# Patient Record
Sex: FEMALE | Race: WHITE
[De-identification: names, ages, dates, MRNs, and addresses within clinical notes are randomized per-mention and may not be internally consistent; named-entity substitution may affect disease eponyms.]

---

## 2021-05-13 ENCOUNTER — HOSPITAL ENCOUNTER (EMERGENCY)
Dept: HOSPITAL 63 - ER | Age: 26
Discharge: HOME | End: 2021-05-13
Payer: OTHER GOVERNMENT

## 2021-05-13 VITALS
BODY MASS INDEX: 28.39 KG/M2 | SYSTOLIC BLOOD PRESSURE: 128 MMHG | WEIGHT: 150.36 LBS | HEIGHT: 61 IN | DIASTOLIC BLOOD PRESSURE: 73 MMHG

## 2021-05-13 DIAGNOSIS — Z88.6: ICD-10-CM

## 2021-05-13 DIAGNOSIS — Z88.8: ICD-10-CM

## 2021-05-13 DIAGNOSIS — G43.909: Primary | ICD-10-CM

## 2021-05-13 PROCEDURE — 70450 CT HEAD/BRAIN W/O DYE: CPT

## 2021-05-13 NOTE — PHYS DOC
General Adult


EDM:


Chief Complaint:  OTHER COMPLAINTS





HPI:


HPI:





Patient is a 25-year-old female who presents with light sensitivity and 

migraines.  Patient states that she had been stationed in [place] when she had her

baby in January.  At that time she was told she had a subdural hematoma and 

hypertension.  Patient is concerned and requesting a CT of her head to rule out 

any acute abnormalities.  Also reporting chronic back pain from the epidural.  

Patient currently takes Flexeril without relief.  Patient is unable to get into 

her PCP and was directed to the emergency room to be seen.  Patient denies chest

pain, dizziness, confusion.  Patient has history of GERD, chronic back pain, 

hypertension, PCOS.


 (SINCERE ZULETA)





Review of Systems:


Review of Systems:


Constitutional:  Denies fever or chills 


Eyes:  Denies change in visual acuity 


HENT:  Denies nasal congestion or sore throat 


Respiratory:  Denies cough or shortness of breath 


Cardiovascular:  Denies chest pain or edema 


GI:  Denies abdominal pain, nausea, vomiting, bloody stools or diarrhea 


: Denies dysuria 


Musculoskeletal: Chronic back pain, denies joint pain 


Integument:  Denies rash 


Neurologic: Reports history of migraines with  light sensitivity


Endocrine:  Denies polyuria or polydipsia 


Lymphatic:  Denies swollen glands 


Psychiatric:  Denies depression or anxiety


 (SINCERE ZULETA)





Allergies:


Allergies:





Allergies








Coded Allergies Type Severity Reaction Last Updated Verified


 


  guaifenesin Allergy Unknown  5/13/21 Yes


 


  methocarbamol Allergy Unknown  5/13/21 Yes








 (SINCERE ZULETA)





Physical Exam:


PE:





Constitutional: Well developed, well nourished, no acute distress, non-toxic 

appearance. []


HENT: Normocephalic, atraumatic, bilateral external ears normal, oropharynx mois

t, no oral exudates, nose normal. []


Eyes: PERRLA, EOMI, conjunctiva normal, no discharge. [] 


Neck: Normal range of motion, no tenderness, supple, no stridor. [] 


Cardiovascular:Heart rate regular rhythm, no murmur []


Lungs & Thorax:  Bilateral breath sounds clear to auscultation []


Abdomen: Bowel sounds normal, soft, no tenderness, no masses, no pulsatile 

masses. [] 


Skin: Warm, dry, no erythema, no rash. [] 


Back: Lower back tenderness, no CVA tenderness. [] 


Extremities: No tenderness, no cyanosis, no clubbing, ROM intact, no edema. [] 


Neurologic: Alert and oriented X 3, normal motor function, normal sensory 

function, no focal deficits noted. []


Psychologic: Affect normal, judgement normal, mood normal. []


 (SINCERE ZULETA)





EKG:


EKG:


[]


 (SINCERE ZULETA)





Radiology/Procedures:


Radiology/Procedures:


[]CT HEAD/BRAIN WO





Clinical indications: Reason: migraine  





COMPARISON: None available.





Technique: Noncontrast axial cross sectional scanning of the head was performed.

 





PQRS compliance Statement





One or more of the following individualized dose reduction techniques were 

utilized for this study:


1.  Automated exposure control


2.  Adjustment of the mA and/or kV according to patient size


3.  Use of iterative reconstruction technique








Findings: No acute intraparenchymal hemorrhage or midline shift or mass-effect 

or hydrocephalus or extra-axial fluid collection is seen. However, there is a 

plaque-like extra-axial infiltrative process measuring 0.6 cm in thickness loc

ated in the bifrontal region. It is hyperdense in appearance. Multiple plaque-

like areas of calcification/ossification are seen within it. This is consistent 

with a meningioma. This measures 9.2 cm in transverse dimension. No focal 

hypodense area or sulci effacement is seen to indicate an acute infarct or edema

 radiographically.  No skull fracture or pneumocephalus is seen. No opac

ification of the mastoid sinuses or the middle ear cavities or the paranasal 

sinuses is seen. There is mild mucosal thickening of both ethmoid sinuses 

however. The maxillary sinuses are not completely seen in this study.





IMPRESSION: No acute intracranial abnormality is seen. Plaque-like meningioma of

 the bifrontal region measuring 0.6 cm in thickness and 9.2 cm in transverse 

dimension.





Electronically signed by: Andrea Flaherty MD (5/13/2021 2:09 PM) [id number]


 (SINCERE ZULETA)





Heart Score:


C/O Chest Pain:  No


Risk Factors:


Risk Factors:  DM, Current or recent (<one month) smoker, HTN, HLP, family 

history of CAD, obesity.


Risk Scores:


Score 0 - 3:  2.5% MACE over next 6 weeks - Discharge Home


Score 4 - 6:  20.3% MACE over next 6 weeks - Admit for Clinical Observation


Score 7 - 10:  72.7% MACE over next 6 weeks - Early Invasive Strategies


 (SINCERE ZULETA)





Course & Med Decision Making:


Course & Med Decision Making


Pertinent Labs and Imaging studies reviewed. (See chart for details)





[] Patient is 25-year-old female who presents with migraines and light 

sensitivity.  Patient states that she delivered her baby in January when she was

 in [place].  Patient was told at that time she had a subdural hematoma.  Patient 

states that she has been trying to get into her PCP but unable to obtain an 

appointment and was sent to the emergency room for evaluation.  Patient is 

concerned that she has gotten worse and wanting a CT of her head.  CT of head 

without contrast ordered to rule out any acute abnormalities or changes.  

Patient is also reporting chronic back pain since the epidural.  Patient 

currently takes Flexeril without any relief.  CT of head without contrast 

negative for any acute abnormality.  CT shows plaque-like meningioma of the 

bifrontal region measuring 0.6 cm in thickness and 9.2 cm in transverse 

dimension.  Patient is given hydrocodone for pain relief.  Patient is given a 

copy of imaging to take to her PCP and also a printout of the report.  

Instructed patient to call her PCP and let them know that she was seen in the 

emergency room and needs a follow-up appointment.  Patient needs possible 

referral for neurology and physical therapy for her lower back pain.  Patient is

 hemodynamically stable and able to ambulate on her own out of the emergency 

room.


 (SINCERE ZULETA)





Dragon Disclaimer:


Dragon Disclaimer:


This electronic medical record was generated, in whole or in part, using a voice

 recognition dictation system.


 (SINCERE ZULETA)


Attending Co-Sign


The patient was seen and interviewed as well as examined at the bedside. The 

chart was reviewed. The case was discussed. Agree with the plan of care.


 (MARIANO REYNA DO)





Departure


Departure:


Impression:  


   Primary Impression:  


   Migraine


   Qualified Codes:  G43.009 - Migraine without aura, not intractable, without 

   status migrainosus


Disposition:  01 HOME / SELF CARE / HOMELESS


Referrals:  


YUE BRUSH DO (PCP)


Patient Instructions:  Back Pain, Adult, Migraine Headache, Easy-to-Read





Additional Instructions:  


You were seen in the emergency room today for migraine and light sensitivity, 

chronic back pain.  The CT of your head did not show any acute abnormalities.  

You were given a hydrocodone to help with pain.  You need to follow-up with your

 PCP for further management and possibly physical therapy for your back pain.  

You need to call your PCP today let them know you were seen in the emergency 

room and that you are needing to be seen for a follow-up so that you can get a 

referral.  Please return to the emergency room if you have worsening symptoms or

 concerns.





EMERGENCY DEPARTMENT GENERAL DISCHARGE INSTRUCTIONS





Thank you for coming to [facility] (ED) today and trusting us

 with you 


care.  We trust that you had a positivie experience in our Emergency Department.

  If you 


wish to speak to the department management, you may call the director at 

[phone].





YOUR FOLLOW UP INSTRUCTIONS ARE AS FOLLOWS:





1.  Do you have a private Doctor?  If you do not have a private doctor, please 

ask for a 


resource list of physicians or clinics that may be able to assist you with 

follow up care.





2.  The Emergency Physician has interpreted your x-rays.  The X-Ray specialist 

will also 


review them.  If there is a change in the findings, you will be notified in 48 

hours when at 


all possible.





3.  A lab test or culture has been done, your results will be reviewed and you 

will be 


notified if you need a change in treatment.





ADDITIONAL INSTRUCTIONS AND INFORMATION:





1.  Your care today has been supervised by a physician who is specially trained 

in emergency 


care.  Many problems require more than one evaluation for a complete diagnosis 

and 


treatment.  We recommend that you schedule your follow up appointment as 

recommended to 


ensure complete treatment of you illness or injury.  If you are unable to obtain

 follow up 


care and continue to have a problem, or if your condition worsens, we recommend 

that you 


return to the ED.





2.  We are not able to safely determine your condition over the phone nor are we

 able to 


give sound medical advice over the phone.  For these safety reasons, if you call

 for medical 


advice we will ask you to come to the ED for further evaluation.





3.  If you have any questions regarding these discharge instructions please call

 the ED at 


[phone].





SAFETY INFORMATION:





In the interest of safety, wellness, and injury prevention; we encourage you to 

wear your 


sealbelt, if you smoke; quite smoking, and we encourage family to use a 

protective helmet 


for bicycling and other sporting events that present an increased risk for head 

injury.





IF YOUR SYMPTOMS WORSEN OR NEW SYMPTOMS DEVELOP, OR YOU HAVE CONCERNS ABOUT YOUR

 CONDITION; 


OR IF YOUR CONDITION WORSENS WHILE YOU ARE WAITING FOR YOUR FOLLOW UP 

APPOINTMENT; EITHER 


CONTACT YOUR PRIMARY CARE DOCTOR, THE PHYSICIAN WHOSE NAME AND NUMBER YOU WERE 

GIVEN, OR 


RETURN TO THE ED IMMEDIATELY.











SINCERE ZULETA               May 13, 2021 13:41


MARIANO REYNA DO                 May 14, 2021 06:36

## 2021-05-13 NOTE — RAD
CT HEAD/BRAIN WO



Clinical indications: Reason: migraine  



COMPARISON: None available.



Technique: Noncontrast axial cross sectional scanning of the head was performed. 



PQRS compliance Statement



One or more of the following individualized dose reduction techniques were utilized for this study:

1.  Automated exposure control

2.  Adjustment of the mA and/or kV according to patient size

3.  Use of iterative reconstruction technique





Findings: No acute intraparenchymal hemorrhage or midline shift or mass-effect or hydrocephalus or ex
tra-axial fluid collection is seen. However, there is a plaque-like extra-axial infiltrative process 
measuring 0.6 cm in thickness located in the bifrontal region. It is hyperdense in appearance. Multip
le plaque-like areas of calcification/ossification are seen within it. This is consistent with a meni
ngioma. This measures 9.2 cm in transverse dimension. No focal hypodense area or sulci effacement is 
seen to indicate an acute infarct or edema radiographically.  No skull fracture or pneumocephalus is 
seen. No opacification of the mastoid sinuses or the middle ear cavities or the paranasal sinuses is 
seen. There is mild mucosal thickening of both ethmoid sinuses however. The maxillary sinuses are not
 completely seen in this study.



IMPRESSION: No acute intracranial abnormality is seen. Plaque-like meningioma of the bifrontal region
 measuring 0.6 cm in thickness and 9.2 cm in transverse dimension.



Electronically signed by: Andrea Flaherty MD (5/13/2021 2:09 PM) [id number]

## 2021-06-29 ENCOUNTER — HOSPITAL ENCOUNTER (EMERGENCY)
Dept: HOSPITAL 63 - ER | Age: 26
Discharge: HOME | End: 2021-06-29
Payer: OTHER GOVERNMENT

## 2021-06-29 VITALS — BODY MASS INDEX: 28.39 KG/M2 | WEIGHT: 150.36 LBS | HEIGHT: 61 IN

## 2021-06-29 VITALS
DIASTOLIC BLOOD PRESSURE: 79 MMHG | SYSTOLIC BLOOD PRESSURE: 125 MMHG | SYSTOLIC BLOOD PRESSURE: 125 MMHG | DIASTOLIC BLOOD PRESSURE: 79 MMHG

## 2021-06-29 DIAGNOSIS — R07.81: Primary | ICD-10-CM

## 2021-06-29 DIAGNOSIS — G89.29: ICD-10-CM

## 2021-06-29 DIAGNOSIS — K21.9: ICD-10-CM

## 2021-06-29 DIAGNOSIS — R07.2: ICD-10-CM

## 2021-06-29 DIAGNOSIS — I10: ICD-10-CM

## 2021-06-29 DIAGNOSIS — Z88.8: ICD-10-CM

## 2021-06-29 LAB
ALBUMIN SERPL-MCNC: 3.1 G/DL (ref 3.4–5)
ALBUMIN/GLOB SERPL: 0.8 {RATIO} (ref 1–1.7)
ALP SERPL-CCNC: 72 U/L (ref 46–116)
ALT SERPL-CCNC: 13 U/L (ref 14–59)
ANION GAP SERPL CALC-SCNC: 10 MMOL/L (ref 6–14)
AST SERPL-CCNC: 12 U/L (ref 15–37)
BASOPHILS # BLD AUTO: 0 X10^3/UL (ref 0–0.2)
BASOPHILS NFR BLD: 1 % (ref 0–3)
BILIRUB SERPL-MCNC: 0.1 MG/DL (ref 0.2–1)
BUN/CREAT SERPL: 20 (ref 6–20)
CA-I SERPL ISE-MCNC: 16 MG/DL (ref 7–20)
CALCIUM SERPL-MCNC: 9 MG/DL (ref 8.5–10.1)
CHLORIDE SERPL-SCNC: 105 MMOL/L (ref 98–107)
CO2 SERPL-SCNC: 26 MMOL/L (ref 21–32)
CREAT SERPL-MCNC: 0.8 MG/DL (ref 0.6–1)
EOSINOPHIL NFR BLD: 0.3 X10^3/UL (ref 0–0.7)
EOSINOPHIL NFR BLD: 5 % (ref 0–3)
ERYTHROCYTE [DISTWIDTH] IN BLOOD BY AUTOMATED COUNT: 14.5 % (ref 11.5–14.5)
GFR SERPLBLD BASED ON 1.73 SQ M-ARVRAT: 85.4 ML/MIN
GLOBULIN SER-MCNC: 3.8 G/DL (ref 2.2–3.8)
GLUCOSE SERPL-MCNC: 107 MG/DL (ref 70–99)
HCT VFR BLD CALC: 34.1 % (ref 36–47)
HGB BLD-MCNC: 11.2 G/DL (ref 12–15.5)
LYMPHOCYTES # BLD: 2.5 X10^3/UL (ref 1–4.8)
LYMPHOCYTES NFR BLD AUTO: 42 % (ref 24–48)
MCH RBC QN AUTO: 30 PG (ref 25–35)
MCHC RBC AUTO-ENTMCNC: 33 G/DL (ref 31–37)
MCV RBC AUTO: 90 FL (ref 79–100)
MONO #: 0.6 X10^3/UL (ref 0–1.1)
MONOCYTES NFR BLD: 10 % (ref 0–9)
NEUT #: 2.5 X10^3UL (ref 1.8–7.7)
NEUTROPHILS NFR BLD AUTO: 42 % (ref 31–73)
PLATELET # BLD AUTO: 448 X10^3/UL (ref 140–400)
POTASSIUM SERPL-SCNC: 4.1 MMOL/L (ref 3.5–5.1)
PROT SERPL-MCNC: 6.9 G/DL (ref 6.4–8.2)
RBC # BLD AUTO: 3.79 X10^6/UL (ref 3.5–5.4)
SODIUM SERPL-SCNC: 141 MMOL/L (ref 136–145)
WBC # BLD AUTO: 5.9 X10^3/UL (ref 4–11)

## 2021-06-29 PROCEDURE — 85025 COMPLETE CBC W/AUTO DIFF WBC: CPT

## 2021-06-29 PROCEDURE — 81025 URINE PREGNANCY TEST: CPT

## 2021-06-29 PROCEDURE — 99285 EMERGENCY DEPT VISIT HI MDM: CPT

## 2021-06-29 PROCEDURE — 71045 X-RAY EXAM CHEST 1 VIEW: CPT

## 2021-06-29 PROCEDURE — 80053 COMPREHEN METABOLIC PANEL: CPT

## 2021-06-29 PROCEDURE — 84484 ASSAY OF TROPONIN QUANT: CPT

## 2021-06-29 PROCEDURE — 36415 COLL VENOUS BLD VENIPUNCTURE: CPT

## 2021-06-29 PROCEDURE — 93005 ELECTROCARDIOGRAM TRACING: CPT

## 2021-06-29 NOTE — RAD
Exam: Chest one view



INDICATION: Chest pain



TECHNIQUE: Frontal view of the chest



Comparisons: None



FINDINGS:

The cardiomediastinal silhouette and pulmonary vessels are within normal limits.



The lung and pleural spaces are clear.



IMPRESSION:

No acute cardiopulmonary process.



Electronically signed by: Agustin Grove MD (6/29/2021 8:41 PM) [facility]

## 2021-06-29 NOTE — EKG
[facility] [address], [place] [address]

Test Date:    2021               Test Time:    20:13:18

Pat Name:     ENZO MEJÍA         Department:   

Patient ID:   [id number]           Room:          

Gender:       F                        Technician:   

:          1995               Requested By: SINCERE ZULETA

Order Number: [id number]            Reading MD:     

                                 Measurements

Intervals                              Axis          

Rate:         76                       P:            34

ME:           142                      QRS:          31

QRSD:         76                       T:            38

QT:           372                                    

QTc:          418                                    

                           Interpretive Statements

SINUS RHYTHM

NORMAL ECG

RI6.02

No previous ECG available for comparison

## 2021-06-29 NOTE — PHYS DOC
Past History


Past Medical History:  Hypertension, Ovarian Cyst, Other


Additional Past Medical Histor:  PCOS, SUBDURAL HEMOTOMA, DJD(FACET)


 (SINCERE ZULETA)


Past Surgical History:  


 (SINCERE ZULETA)


Alcohol Use:  None


 (SINCERE ZULETA)





General Adult


EDM:


Chief Complaint:  CHEST PAIN





HPI:


HPI:





Patient is a 26-year-old female who presents with midsternal chest pain that 

started on Wednesday.  Patient states "pain is constant and feels like a 

stabbing pain".  Patient denies any radiation of pain.  Denies shortness of 

breath, nausea/vomiting/diarrhea, dizziness.  Patient states "I also feel like I

am getting more tired than normal when I am trying to go up and down the 

stairs".  "I was recently sick about 3 weeks ago with a cough and upper 

respiratory infection".  Patient has a history of chronic back pain, PCOS, GERD


 (SINCERE ZULETA)





Review of Systems:


Review of Systems:


Constitutional:  Denies fever or chills 


Eyes:  Denies change in visual acuity 


HENT:  Denies nasal congestion or sore throat 


Respiratory:  Denies cough or shortness of breath 


Cardiovascular: Reports midsternal chest pain


GI:  Denies abdominal pain, nausea, vomiting, bloody stools or diarrhea 


: Denies dysuria 


Musculoskeletal:  Denies back pain or joint pain 


Integument:  Denies rash 


Neurologic:  Denies headache, focal weakness or sensory changes 


Endocrine:  Denies polyuria or polydipsia 


Lymphatic:  Denies swollen glands 


Psychiatric:  Denies depression or anxiety


 (SINCERE ZULETA)





Allergies:


Allergies:





Allergies








Coded Allergies Type Severity Reaction Last Updated Verified


 


  guaifenesin Allergy Unknown  21 Yes


 


  methocarbamol Allergy Unknown  21 Yes








 (SINCERE ZULETA)





Physical Exam:


PE:





Constitutional: Well developed, well nourished, no acute distress, non-toxic 

appearance. []


HENT: Normocephalic, atraumatic, bilateral external ears normal, oropharynx mo

ist, no oral exudates, nose normal. []


Eyes: PERRLA, EOMI, conjunctiva normal, no discharge. [] 


Neck: Normal range of motion, no tenderness, supple, no stridor. [] 


Cardiovascular:Heart rate regular rhythm, no murmur []


Lungs & Thorax:  Bilateral breath sounds clear to auscultation []


Abdomen: Bowel sounds normal, soft, no tenderness, no masses, no pulsatile 

masses. [] 


Skin: Warm, dry, no erythema, no rash. [] 


Back: No tenderness, no CVA tenderness. [] 


Extremities: No tenderness, no cyanosis, no clubbing, ROM intact, no edema. [] 


Neurologic: Alert and oriented X 3, normal motor function, normal sensory 

function, no focal deficits noted. []


Psychologic: Affect normal, judgement normal, mood normal. []


 (SINCERE ZULETA)





Current Patient Data:


Labs:





                                Laboratory Tests








Test


 21


20:27


 


POC Urine HCG, Qualitative


 hcg negative


(Negative)








Vital Signs:





                                   Vital Signs








  Date Time  Temp Pulse Resp B/P (MAP) Pulse Ox O2 Delivery O2 Flow Rate FiO2


 


21 19:48 98.2 74 18 117/74 (88) 96 Room Air  








 (SINCERE ZULETA)





EKG:


EKG:


Sinus rhythm.  Heart rate 76 bpm []


 (SINCERE ZULETA)





Radiology/Procedures:


Radiology/Procedures:


[]Exam: Chest one view





INDICATION: Chest pain





TECHNIQUE: Frontal view of the chest





Comparisons: None





FINDINGS:


The cardiomediastinal silhouette and pulmonary vessels are within normal limits.





The lung and pleural spaces are clear.





IMPRESSION:


No acute cardiopulmonary process.





Electronically signed by: Agustin Grove MD (2021 8:41 PM) [facility]


 (SINCERE ZULETA)





Heart Score:


C/O Chest Pain:  Yes


HEART Score for Chest Pain:  








HEART Score for Chest Pain Response (Comments) Value


 


History Slighlty/Non-Suspicious 0


 


ECG Normal 0


 


Age < 45 0


 


Risk Factors No Risk Factors 0


 


Troponin < Normal Limit 0


 


Total  0








Risk Factors:


Risk Factors:  DM, Current or recent (<one month) smoker, HTN, HLP, family 

history of CAD, obesity.


Risk Scores:


Score 0 - 3:  2.5% MACE over next 6 weeks - Discharge Home


Score 4 - 6:  20.3% MACE over next 6 weeks - Admit for Clinical Observation


Score 7 - 10:  72.7% MACE over next 6 weeks - Early Invasive Strategies


 (SINCERE ZULETA)





Course & Med Decision Making:


Course & Med Decision Making


Pertinent Labs and Imaging studies reviewed. (See chart for details)





[] 26-year-old female presents with midsternal chest pain that started on 

Wednesday.  All labs unremarkable.  Chest x-ray is negative.  Troponin is 

negative.  Heart score of 0.  EKG shows sinus rhythm.  Patient was likely has 

pleuritic chest pain.  Patient was recently sick with a cough.  Discussed 

results with patient.  Patient is appreciative and okay with discharge plan.  

Patient is hemodynamically stable able to ambulate on her own in the emergency 

room.


 (SINCERE ZULETA)


Course & Med Decision Making


Did not see or evaluate patient.  Agree with NP's work-up and disposition per 

note.


 (KADY BRAVO MD)


Dragon Disclaimer:


Dragon Disclaimer:


This electronic medical record was generated, in whole or in part, using a voice

 recognition dictation system.


 (SINCERE ZULETA)





Departure


Departure:


Impression:  


   Primary Impression:  


   Chest pain, pleuritic


Disposition:   HOME / SELF CARE / HOMELESS


Condition:  STABLE


Referrals:  


JARED ROSALES DO (PCP)


Patient Instructions:  Pleurisy, Easy-to-Read





Additional Instructions:  


You were seen in the emergency room for pleuritic chest pain.  All of your labs 

were unremarkable.  Chest x-ray was negative.  Please follow-up with your PCP if

 you continue to have discomfort.  Ibuprofen and Tylenol at home for pain.  Turn

 emergency room for worsening symptoms or concerns.





EMERGENCY DEPARTMENT GENERAL DISCHARGE INSTRUCTIONS





Thank you for coming to [facility] (ED) today and trusting us

 with you 


care.  We trust that you had a positivie experience in our Emergency Department.

  If you 


wish to speak to the department management, you may call the director at 

[phone].





YOUR FOLLOW UP INSTRUCTIONS ARE AS FOLLOWS:





1.  Do you have a private Doctor?  If you do not have a private doctor, please 

ask for a 


resource list of physicians or clinics that may be able to assist you with 

follow up care.





2.  The Emergency Physician has interpreted your x-rays.  The X-Ray specialist 

will also 


review them.  If there is a change in the findings, you will be notified in 48 h

ours when at 


all possible.





3.  A lab test or culture has been done, your results will be reviewed and you 

will be 


notified if you need a change in treatment.





ADDITIONAL INSTRUCTIONS AND INFORMATION:





1.  Your care today has been supervised by a physician who is specially trained 

in emergency 


care.  Many problems require more than one evaluation for a complete diagnosis 

and 


treatment.  We recommend that you schedule your follow up appointment as 

recommended to 


ensure complete treatment of you illness or injury.  If you are unable to obtain

 follow up 


care and continue to have a problem, or if your condition worsens, we recommend 

that you 


return to the ED.





2.  We are not able to safely determine your condition over the phone nor are we

 able to 


give sound medical advice over the phone.  For these safety reasons, if you call

 for medical 


advice we will ask you to come to the ED for further evaluation.





3.  If you have any questions regarding these discharge instructions please call

 the ED at 


[phone].





SAFETY INFORMATION:





In the interest of safety, wellness, and injury prevention; we encourage you to 

wear your 


sealbelt, if you smoke; quite smoking, and we encourage family to use a 

protective helmet 


for bicycling and other sporting events that present an increased risk for head 

injury.





IF YOUR SYMPTOMS WORSEN OR NEW SYMPTOMS DEVELOP, OR YOU HAVE CONCERNS ABOUT YOUR

 CONDITION; 


OR IF YOUR CONDITION WORSENS WHILE YOU ARE WAITING FOR YOUR FOLLOW UP 

APPOINTMENT; EITHER 


CONTACT YOUR PRIMARY CARE DOCTOR, THE PHYSICIAN WHOSE NAME AND NUMBER YOU WERE 

GIVEN, OR 


RETURN TO THE ED IMMEDIATELY.











SINCERE ZULETA               2021 20:33


KADY BRAVO MD               2021 23:34

## 2021-09-10 ENCOUNTER — HOSPITAL ENCOUNTER (OUTPATIENT)
Dept: HOSPITAL 63 - RAD | Age: 26
End: 2021-09-10
Attending: NURSE PRACTITIONER
Payer: OTHER GOVERNMENT
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
## 2021-09-10 DIAGNOSIS — S69.92XA: Primary | ICD-10-CM
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
## 2021-09-10 PROCEDURE — 73110 X-RAY EXAM OF WRIST: CPT

## 2021-09-10 NOTE — RAD
Exam: Left wrist 4 views



INDICATION: Left wrist pain



TECHNIQUE: Frontal, lateral and oblique views of the left wrist with scaphoid view.



Comparisons: None



FINDINGS:

Bone mineralization is normal. No acute or healed fractures. Soft tissues are unremarkable. Joint spa
sandra are well-maintained.



IMPRESSION:

No acute osseous abnormality of the left wrist. If the patient is demonstrating snuffbox tenderness r
ecommend splinting with repeat imaging in 5-7 days to rule out an occult scaphoid injury.



Electronically signed by: Agustin Grove MD (9/10/2021 3:02 PM) [facility]

## 2021-09-22 ENCOUNTER — HOSPITAL ENCOUNTER (EMERGENCY)
Dept: HOSPITAL 63 - ER | Age: 26
Discharge: HOME | End: 2021-09-22
Payer: OTHER GOVERNMENT

## 2021-09-22 VITALS — DIASTOLIC BLOOD PRESSURE: 84 MMHG | SYSTOLIC BLOOD PRESSURE: 131 MMHG

## 2021-09-22 VITALS — HEIGHT: 61 IN | BODY MASS INDEX: 28.39 KG/M2 | WEIGHT: 150.36 LBS

## 2021-09-22 DIAGNOSIS — Z88.8: ICD-10-CM

## 2021-09-22 DIAGNOSIS — R00.0: ICD-10-CM

## 2021-09-22 DIAGNOSIS — R00.2: Primary | ICD-10-CM

## 2021-09-22 DIAGNOSIS — I10: ICD-10-CM

## 2021-09-22 DIAGNOSIS — Z98.890: ICD-10-CM

## 2021-09-22 LAB
ANION GAP SERPL CALC-SCNC: 10 MMOL/L (ref 6–14)
BASOPHILS # BLD AUTO: 0 X10^3/UL (ref 0–0.2)
BASOPHILS NFR BLD: 1 % (ref 0–3)
CA-I SERPL ISE-MCNC: 12 MG/DL (ref 7–20)
CALCIUM SERPL-MCNC: 9.3 MG/DL (ref 8.5–10.1)
CHLORIDE SERPL-SCNC: 105 MMOL/L (ref 98–107)
CO2 SERPL-SCNC: 25 MMOL/L (ref 21–32)
CREAT SERPL-MCNC: 0.7 MG/DL (ref 0.6–1)
EOSINOPHIL NFR BLD: 0.1 X10^3/UL (ref 0–0.7)
EOSINOPHIL NFR BLD: 2 % (ref 0–3)
ERYTHROCYTE [DISTWIDTH] IN BLOOD BY AUTOMATED COUNT: 14.8 % (ref 11.5–14.5)
GFR SERPLBLD BASED ON 1.73 SQ M-ARVRAT: 99.6 ML/MIN
GLUCOSE SERPL-MCNC: 108 MG/DL (ref 70–99)
HCT VFR BLD CALC: 38.8 % (ref 36–47)
HGB BLD-MCNC: 12.7 G/DL (ref 12–15.5)
LYMPHOCYTES # BLD: 2.2 X10^3/UL (ref 1–4.8)
LYMPHOCYTES NFR BLD AUTO: 39 % (ref 24–48)
MCH RBC QN AUTO: 29 PG (ref 25–35)
MCHC RBC AUTO-ENTMCNC: 33 G/DL (ref 31–37)
MCV RBC AUTO: 88 FL (ref 79–100)
MONO #: 0.4 X10^3/UL (ref 0–1.1)
MONOCYTES NFR BLD: 8 % (ref 0–9)
NEUT #: 2.8 X10^3UL (ref 1.8–7.7)
NEUTROPHILS NFR BLD AUTO: 51 % (ref 31–73)
PLATELET # BLD AUTO: 432 X10^3/UL (ref 140–400)
POTASSIUM SERPL-SCNC: 3.9 MMOL/L (ref 3.5–5.1)
RBC # BLD AUTO: 4.4 X10^6/UL (ref 3.5–5.4)
SODIUM SERPL-SCNC: 140 MMOL/L (ref 136–145)
WBC # BLD AUTO: 5.6 X10^3/UL (ref 4–11)

## 2021-09-22 PROCEDURE — 36415 COLL VENOUS BLD VENIPUNCTURE: CPT

## 2021-09-22 PROCEDURE — 96360 HYDRATION IV INFUSION INIT: CPT

## 2021-09-22 PROCEDURE — 80048 BASIC METABOLIC PNL TOTAL CA: CPT

## 2021-09-22 PROCEDURE — 93005 ELECTROCARDIOGRAM TRACING: CPT

## 2021-09-22 PROCEDURE — 85025 COMPLETE CBC W/AUTO DIFF WBC: CPT

## 2021-09-22 PROCEDURE — 99285 EMERGENCY DEPT VISIT HI MDM: CPT

## 2021-09-22 PROCEDURE — 71045 X-RAY EXAM CHEST 1 VIEW: CPT

## 2021-09-22 PROCEDURE — 84443 ASSAY THYROID STIM HORMONE: CPT

## 2021-09-22 RX ADMIN — METOCLOPRAMIDE ONE MG: 5 INJECTION, SOLUTION INTRAMUSCULAR; INTRAVENOUS at 13:00

## 2021-09-22 RX ADMIN — SODIUM CHLORIDE ONE MLS/HR: 0.9 INJECTION, SOLUTION INTRAVENOUS at 13:16

## 2021-09-22 NOTE — EKG
[facility] [address], [place] [address]

Test Date:    2021               Test Time:    13:16:25

Pat Name:     ENZO MEJÍA         Department:   

Patient ID:   [id number]           Room:          

Gender:       F                        Technician:   NEFTALI

:          1995               Requested By: FABRICIO SCOTT

Order Number: [id number]            Reading MD:     

                                 Measurements

Intervals                              Axis          

Rate:         79                       P:            30

ID:           [id number]                      QRS:          35

QRSD:         76                       T:            29

QT:           340                                    

QTc:          391                                    

                           Interpretive Statements

SINUS RHYTHM

NORMAL ECG

RI6.02

No previous ECG available for comparison

## 2021-09-22 NOTE — PHYS DOC
Past History


Past Medical History:  Hypertension, Ovarian Cyst, Other


Additional Past Medical Histor:  PCOS, SUBDURAL HEMOTOMA, DJD(FACET)


Past Surgical History:  , Other


Additional Past Surgical Histo:  cyst removal


Alcohol Use:  None





General Adult


EDM:


Chief Complaint:  RAPID HEART RATE





HPI:


HPI:


26-year-old female with a history of subdural hematoma, recurrent regular 

migraines presents to the emergency department complaining of tachycardia and 

palpitations that started a few hours ago.  She reports that she has never had 

this before, she reports that when she got up in the bathroom and went to the 

hallway, her heart rate went from .  She notes that her heart rate has 

been erratic all day.  She denies any new headache, stating that her current 

migraine is regular for her.  The patient denies nausea, vomiting, fever, 

chills, chest pain, shortness of breath, abdominal pain, urinary symptoms, 

cough, recent trauma, or any other complaints.





Review of Systems:


Review of Systems:


Constitutional: Denies fever or chills. 


Eyes: Denies change in vision, pain.


HENT: Denies congestion or sore throat.


Respiratory: Denies cough or shortness of breath. 


Cardiovascular: Denies palpitations, denies chest pain, syncope. 


GI: Denies abdominal pain, nausea. 


: Denies change in urination, dysuria. 


Musculoskeletal: Denies extremity pain, or trauma. 


Skin: Denies rash, skin change. 


Neurologic: Denies headache, focal weakness. 


Psychiatric: Denies depression or anxiety.





All other systems reviewed as negative except for what was mentioned in the HPI.





Allergies:


Allergies:





Allergies








Coded Allergies Type Severity Reaction Last Updated Verified


 


  guaifenesin Allergy Unknown  21 Yes


 


  methocarbamol Allergy Unknown  21 Yes











Physical Exam:


PE:





Constitutional: Well developed, well nourished, no acute distress, non-toxic 

appearance. []


HENT: Normocephalic, atraumatic, bilateral external ears normal, oropharynx 

moist, no oral exudates, nose normal. []


Eyes: PERRLA, EOMI, conjunctiva normal, no discharge. [] 


Neck: Normal range of motion, no tenderness, supple, no stridor. [] 


Cardiovascular:Heart rate regular rhythm, no murmur []


Lungs & Thorax:  Bilateral breath sounds clear to auscultation []


Abdomen: Bowel sounds normal, soft, no tenderness, no masses, no pulsatile 

masses. [] 


Skin: Warm, dry, no erythema, no rash. [] 


Back: No tenderness, no CVA tenderness. [] 


Extremities: No tenderness, no cyanosis, no clubbing, ROM intact, no edema. [] 


Neurologic: Alert and oriented X 3, normal motor function, normal sensory 

function, no focal deficits noted. []


Psychologic: Affect normal, judgement normal, mood normal. []





Current Patient Data:


Labs:





                                Laboratory Tests








Test


 21


13:11


 


White Blood Count


 5.6 x10^3/uL


(4.0-11.0)


 


Red Blood Count


 4.40 x10^6/uL


(3.50-5.40)


 


Hemoglobin


 12.7 g/dL


(12.0-15.5)


 


Hematocrit


 38.8 %


(36.0-47.0)


 


Mean Corpuscular Volume


 88 fL ()





 


Mean Corpuscular Hemoglobin 29 pg (25-35)  


 


Mean Corpuscular Hemoglobin


Concent 33 g/dL


(31-37)


 


Red Cell Distribution Width


 14.8 %


(11.5-14.5)  H


 


Platelet Count


 432 x10^3/uL


(140-400)  H


 


Neutrophils (%) (Auto) 51 % (31-73)  


 


Lymphocytes (%) (Auto) 39 % (24-48)  


 


Monocytes (%) (Auto) 8 % (0-9)  


 


Eosinophils (%) (Auto) 2 % (0-3)  


 


Basophils (%) (Auto) 1 % (0-3)  


 


Neutrophils # (Auto)


 2.8 x10^3uL


(1.8-7.7)


 


Lymphocytes # (Auto)


 2.2 x10^3/uL


(1.0-4.8)


 


Monocytes # (Auto)


 0.4 x10^3/uL


(0.0-1.1)


 


Eosinophils # (Auto)


 0.1 x10^3/uL


(0.0-0.7)


 


Basophils # (Auto)


 0.0 x10^3/uL


(0.0-0.2)


 


Sodium Level


 140 mmol/L


(136-145)


 


Potassium Level


 3.9 mmol/L


(3.5-5.1)


 


Chloride Level


 105 mmol/L


()


 


Carbon Dioxide Level


 25 mmol/L


(21-32)


 


Anion Gap 10 (6-14)  


 


Blood Urea Nitrogen


 12 mg/dL


(7-20)


 


Creatinine


 0.7 mg/dL


(0.6-1.0)


 


Estimated GFR


(Cockcroft-Gault) 99.6  





 


Glucose Level


 108 mg/dL


(70-99)  H


 


Calcium Level


 9.3 mg/dL


(8.5-10.1)








Vital Signs:





Vital Signs








  Date Time  Temp Pulse Resp B/P (MAP) Pulse Ox O2 Delivery O2 Flow Rate FiO2


 


21 14:30  98 16 131/84 (100) 98 Room Air  


 


21 13:30  100 16 147/87 (107) 97 Room Air  


 


21 12:45 98.3 104 16 140/94 (109) 98   








1509: Heart rate 76, blood pressure 132/84, oxygen saturation 90% on room air





EKG:


EKG:





Normal sinus rhythm rate of 79, no ST-T wave changes, no ectopic beats, normal 

axis, normal IN, QRS, and QTc intervals. Impression: Normal EKG. interpreted by 

Fabricio aldridge D.O.





Radiology/Procedures:


Radiology/Procedures:


PROCEDURE: CHEST AP ONLY





XR CHEST 1V





History: Reason: tachycardia / Spl. Instructions:  / History: 





Comparison: 2021





Findings:


No consolidation or pleural effusion. Normal heart size. No pneumothorax.





Impression: 


1.  No acute cardiopulmonary process.





Electronically signed by: Marlo Sadler DO (2021 1:32 PM)





Heart Score:


C/O Chest Pain:  No





Course & Med Decision Making:


Course & Med Decision Making


Labs EKG and cardiac monitoring are within normal limits.  Patient was initially

 tachycardic but upon her observation in the emergency department she was 

consistently in the 70s.  Plan to discharge the patient home with return 

precautions.  Advised follow-up with cardiology for further monitoring.  She 

looks well the time of discharge with improved vital signs.





Departure


Departure:


Impression:  


   Primary Impression:  


   Heart palpitations


Disposition:   HOME / SELF CARE / HOMELESS


Condition:  GOOD


Referrals:  


JARED ROSALES DO (PCP)








CATHIE JULIEN MD


Patient Instructions:  Nonspecific Tachycardia





Additional Instructions:  


You were seen in the emergency department and your health condition was deemed 

not to require admission to the hospital.  It is important to realize that we 

can only evaluate you during the time that you are in her department.  

Occasionally health conditions can worsen upon leaving the emergency department.

  If this were to happen, please return to and allow us the opportunity to 

reevaluate you.  It is a pleasure to take care of your health needs.  Return to 

the ER if your symptoms worsen, do not improve, or if you develop additional 

symptoms that are concerning to you


Your TSH level is pending, you will receive a call  if this level is abnormal.











FABRICIO SCOTT DO             Sep 22, 2021 13:02

## 2021-09-22 NOTE — RAD
XR CHEST 1V



History: Reason: tachycardia / Spl. Instructions:  / History: 



Comparison: June 29, 2021



Findings:

No consolidation or pleural effusion. Normal heart size. No pneumothorax.



Impression: 

1.  No acute cardiopulmonary process.



Electronically signed by: Marlo Sadler DO (9/22/2021 1:32 PM) [id number]

## 2021-09-26 ENCOUNTER — HOSPITAL ENCOUNTER (EMERGENCY)
Dept: HOSPITAL 63 - ER | Age: 26
Discharge: HOME | End: 2021-09-26
Payer: OTHER GOVERNMENT

## 2021-09-26 VITALS
DIASTOLIC BLOOD PRESSURE: 85 MMHG | SYSTOLIC BLOOD PRESSURE: 129 MMHG | SYSTOLIC BLOOD PRESSURE: 129 MMHG | DIASTOLIC BLOOD PRESSURE: 85 MMHG

## 2021-09-26 VITALS — HEIGHT: 61 IN | WEIGHT: 150.36 LBS | BODY MASS INDEX: 28.39 KG/M2

## 2021-09-26 DIAGNOSIS — I10: ICD-10-CM

## 2021-09-26 DIAGNOSIS — R07.81: Primary | ICD-10-CM

## 2021-09-26 DIAGNOSIS — Z88.8: ICD-10-CM

## 2021-09-26 PROCEDURE — 99285 EMERGENCY DEPT VISIT HI MDM: CPT

## 2021-09-26 PROCEDURE — 71045 X-RAY EXAM CHEST 1 VIEW: CPT

## 2021-09-26 PROCEDURE — 84484 ASSAY OF TROPONIN QUANT: CPT

## 2021-09-26 PROCEDURE — 36415 COLL VENOUS BLD VENIPUNCTURE: CPT

## 2021-09-26 PROCEDURE — 93005 ELECTROCARDIOGRAM TRACING: CPT

## 2021-09-26 NOTE — PHYS DOC
Past History


Past Medical History:  Hypertension, Ovarian Cyst, Other


Additional Past Medical Histor:  PCOS, SUBDURAL HEMOTOMA, DJD(FACET)


 (SINCERE ZULETA)


Past Surgical History:  , Other


Additional Past Surgical Histo:  cyst removal


 (SINCERE ZULETA)


Alcohol Use:  None


 (SINCERE ZULETA)





General Adult


EDM:


Chief Complaint:  CHEST PAIN





HPI:


HPI:





Patient is a 26-year-old female who presents with chest pain that is worse with 

deep breathing and coughing.  Patient was recently diagnosed with Covid.  

Patient denies shortness of breath, fever.  Pain is worse with movement and 

pain.  Patient is able to reproduce pain.  Reports that Tylenol does help with 

discomfort.  But denies taking anything today.  Denies nausea/vomiting/diarrhea


 (SINCERE ZULETA)





Review of Systems:


Review of Systems:


Constitutional:  Denies fever or chills 


Eyes:  Denies change in visual acuity 


HENT:  Denies nasal congestion or sore throat 


Respiratory:  Denies cough or shortness of breath 


Cardiovascular:  Denies chest pain or edema 


GI:  Denies abdominal pain, nausea, vomiting, bloody stools or diarrhea 


: Denies dysuria 


Musculoskeletal:  Denies back pain or joint pain 


Integument:  Denies rash 


Neurologic:  Denies headache, focal weakness or sensory changes 


Endocrine:  Denies polyuria or polydipsia 


Lymphatic:  Denies swollen glands 


Psychiatric:  Denies depression or anxiety


 (SINCERE ZULETA)





Allergies:


Allergies:





Allergies








Coded Allergies Type Severity Reaction Last Updated Verified


 


  guaifenesin Allergy Unknown  21 Yes


 


  methocarbamol Allergy Unknown  21 Yes








 (SINCERE ZULETA)





Physical Exam:


PE:





Constitutional: Well developed, well nourished, no acute distress, non-toxic 

appearance. []


HENT: Normocephalic, atraumatic, bilateral external ears normal, oropharynx 

moist, no oral exudates, nose normal. []


Eyes: PERRLA, EOMI, conjunctiva normal, no discharge. [] 


Neck: Normal range of motion, no tenderness, supple, no stridor. [] 


Cardiovascular:Heart rate regular rhythm, no murmur []


Lungs & Thorax:  Bilateral breath sounds clear to auscultation []


Abdomen: Bowel sounds normal, soft, no tenderness, no masses, no pulsatile 

masses. [] 


Skin: Warm, dry, no erythema, no rash. [] 


Back: No tenderness, no CVA tenderness. [] 


Extremities: No tenderness, no cyanosis, no clubbing, ROM intact, no edema. [] 


Neurologic: Alert and oriented X 3, normal motor function, normal sensory 

function, no focal deficits noted. []


Psychologic: Affect normal, judgement normal, mood normal. []


 (SINCERE ZULETA)





Current Patient Data:


Labs:





                                Laboratory Tests








Test


 21


15:32


 


Troponin I Quantitative


 < 0.017 ng/mL


(0-0.055)








Vital Signs:





                                   Vital Signs








  Date Time  Temp Pulse Resp B/P (MAP) Pulse Ox O2 Delivery O2 Flow Rate FiO2


 


21 15:17 98.5 87 16 134/89 (104) 98 Room Air  








 (SINCERE ZULETA)





EKG:


EKG:


[]


 (SINCERE ZULETA)





Radiology/Procedures:


Radiology/Procedures:


[]


XR CHEST 1V 2021 3:40 PM





INDICATION: Chest pain





COMPARISON: 2021





TECHNIQUE: Portable frontal view of the chest is provided.





FINDINGS:





The cardiomediastinal silhouette is within normal limits. Lungs are clear.





There are no significant pleural effusions. There is no pulmonary vascular 

congestion. No pneumothorax.





No suspicious osseous abnormality.





IMPRESSION:





There is no acute cardiopulmonary process.





Electronically signed by: Fabiola Montes MD (2021 4:13 PM) [facility]


 (SINCERE ZULETA)





Heart Score:


C/O Chest Pain:  Yes


HEART Score for Chest Pain:  








HEART Score for Chest Pain Response (Comments) Value


 


History Slighlty/Non-Suspicious 0


 


ECG Normal 0


 


Age < 45 0


 


Risk Factors No Risk Factors 0


 


Troponin < Normal Limit 0


 


Total  0








Risk Factors:


Risk Factors:  DM, Current or recent (<one month) smoker, HTN, HLP, family 

history of CAD, obesity.


Risk Scores:


Score 0 - 3:  2.5% MACE over next 6 weeks - Discharge Home


Score 4 - 6:  20.3% MACE over next 6 weeks - Admit for Clinical Observation


Score 7 - 10:  72.7% MACE over next 6 weeks - Early Invasive Strategies


 (SINCERE ZULETA)





Course & Med Decision Making:


Course & Med Decision Making


Pertinent Labs and Imaging studies reviewed. (See chart for details)





[] 26-year-old female presents with reproducible, chest pain.  No radiation.  

Denies shortness of breath, fever.  Patient recently was diagnosed with Covid.  

Patient states that she took Tylenol for the discomfort which did help.  Denies 

taking anything today.





Chest x-ray is unremarkable.  Troponin is negative.  Heart score of 0.  

Discussed results with patient.  Explained to patient she is having pleuritic 

chest pain.  Taking ibuprofen and Tylenol for pain.  Gave strict return 

precautions.  Follow-up with PCP.





 (SINCERE ZULETA)





Dragon Disclaimer:


Dragon Disclaimer:


This electronic medical record was generated, in whole or in part, using a voice

 recognition dictation system.


 (SINCERE ZULETA)


Attending Co-Sign


The patient was seen and interviewed as well as examined at the bedside. The 

chart was reviewed. The case was discussed. Agree with the plan of care.


 (MARIANO REYNA DO)





Departure


Departure:


Impression:  


   Primary Impression:  


   Pleuritic chest pain


Disposition:   HOME / SELF CARE / HOMELESS


Condition:  STABLE


Referrals:  


JARED ROSALES DO (PCP)


Patient Instructions:  Pleurisy, Easy-to-Read





Additional Instructions:  


EMERGENCY DEPARTMENT GENERAL DISCHARGE INSTRUCTIONS





Thank you for coming to [facility] (ED) today and trusting us

 with you 


care.  We trust that you had a positivie experience in our Emergency Department.

  If you 


wish to speak to the department management, you may call the director at 

[phone].





YOUR FOLLOW UP INSTRUCTIONS ARE AS FOLLOWS:





1.  Do you have a private Doctor?  If you do not have a private doctor, please 

ask for a 


resource list of physicians or clinics that may be able to assist you with 

follow up care.





2.  The Emergency Physician has interpreted your x-rays.  The X-Ray specialist 

will also 


review them.  If there is a change in the findings, you will be notified in 48 

hours when at 


all possible.





3.  A lab test or culture has been done, your results will be reviewed and you 

will be 


notified if you need a change in treatment.





ADDITIONAL INSTRUCTIONS AND INFORMATION:





1.  Your care today has been supervised by a physician who is specially trained 

in emergency 


care.  Many problems require more than one evaluation for a complete diagnosis 

and 


treatment.  We recommend that you schedule your follow up appointment as 

recommended to 


ensure complete treatment of you illness or injury.  If you are unable to obtain

 follow up 


care and continue to have a problem, or if your condition worsens, we recommend 

that you 


return to the ED.





2.  We are not able to safely determine your condition over the phone nor are we

 able to 


give sound medical advice over the phone.  For these safety reasons, if you call

 for medical 


advice we will ask you to come to the ED for further evaluation.





3.  If you have any questions regarding these discharge instructions please call

 the ED at 


[phone].





SAFETY INFORMATION:





In the interest of safety, wellness, and injury prevention; we encourage you to 

wear your 


sealbelt, if you smoke; quite smoking, and we encourage family to use a 

protective helmet 


for bicycling and other sporting events that present an increased risk for head 

injury.





IF YOUR SYMPTOMS WORSEN OR NEW SYMPTOMS DEVELOP, OR YOU HAVE CONCERNS ABOUT YOUR

 CONDITION; 


OR IF YOUR CONDITION WORSENS WHILE YOU ARE WAITING FOR YOUR FOLLOW UP 

APPOINTMENT; EITHER 


CONTACT YOUR PRIMARY CARE DOCTOR, THE PHYSICIAN WHOSE NAME AND NUMBER YOU WERE 

GIVEN, OR 


RETURN TO THE ED IMMEDIATELY.











SINCERE ZULETA               Sep 26, 2021 17:05


MARIANO REYNA DO                 Sep 27, 2021 15:48

## 2021-09-26 NOTE — RAD
XR CHEST 1V 9/26/2021 3:40 PM



INDICATION: Chest pain



COMPARISON: 9/22/2021



TECHNIQUE: Portable frontal view of the chest is provided.



FINDINGS:



The cardiomediastinal silhouette is within normal limits. Lungs are clear.



There are no significant pleural effusions. There is no pulmonary vascular congestion. No pneumothora
x.



No suspicious osseous abnormality.



IMPRESSION:



There is no acute cardiopulmonary process.



Electronically signed by: Fabiola Montes MD (9/26/2021 4:13 PM) [facility]

## 2021-10-01 ENCOUNTER — HOSPITAL ENCOUNTER (OUTPATIENT)
Dept: HOSPITAL 63 - RAD | Age: 26
End: 2021-10-01
Attending: FAMILY MEDICINE
Payer: OTHER GOVERNMENT

## 2021-10-01 DIAGNOSIS — M79.671: Primary | ICD-10-CM

## 2021-10-01 PROCEDURE — 73630 X-RAY EXAM OF FOOT: CPT

## 2021-10-01 NOTE — RAD
Study: XR FOOT_RIGHT 3 VIEWS



Indication: Injury. Pain.



Comparison: None.



Findings:



No displaced fracture. Alignment is within normal limits. Maintained joint spaces. Radiographically u
nremarkable soft tissues.



Impression:



No displaced fracture or traumatic malalignment.



Electronically signed by: BENITO VILLASENOR MD (10/1/2021 6:41 PM) [facility]

## 2022-03-28 ENCOUNTER — HOSPITAL ENCOUNTER (EMERGENCY)
Dept: HOSPITAL 63 - ER | Age: 27
Discharge: HOME | End: 2022-03-28
Payer: OTHER GOVERNMENT

## 2022-03-28 VITALS — HEIGHT: 61 IN | BODY MASS INDEX: 29.14 KG/M2 | WEIGHT: 154.32 LBS

## 2022-03-28 VITALS — DIASTOLIC BLOOD PRESSURE: 78 MMHG | SYSTOLIC BLOOD PRESSURE: 131 MMHG

## 2022-03-28 DIAGNOSIS — Z88.8: ICD-10-CM

## 2022-03-28 DIAGNOSIS — M25.562: ICD-10-CM

## 2022-03-28 DIAGNOSIS — M25.561: ICD-10-CM

## 2022-03-28 DIAGNOSIS — O16.2: ICD-10-CM

## 2022-03-28 DIAGNOSIS — Z3A.16: ICD-10-CM

## 2022-03-28 DIAGNOSIS — O12.02: Primary | ICD-10-CM

## 2022-03-28 DIAGNOSIS — M54.59: ICD-10-CM

## 2022-03-28 LAB
APTT PPP: YELLOW S
BACTERIA #/AREA URNS HPF: (no result) /HPF
FIBRINOGEN PPP-MCNC: CLEAR MG/DL
GLUCOSE UR STRIP-MCNC: (no result) MG/DL
NITRITE UR QL STRIP: (no result)
RBC #/AREA URNS HPF: 0 /HPF (ref 0–2)
SP GR UR STRIP: 1.02
SQUAMOUS #/AREA URNS LPF: (no result) /LPF
UROBILINOGEN UR-MCNC: 0.2 MG/DL
WBC #/AREA URNS HPF: (no result) /HPF (ref 0–4)

## 2022-03-28 PROCEDURE — 81001 URINALYSIS AUTO W/SCOPE: CPT

## 2022-03-28 PROCEDURE — 99283 EMERGENCY DEPT VISIT LOW MDM: CPT

## 2022-03-28 NOTE — PHYS DOC
Past History


Past Medical History:  Hypertension, Ovarian Cyst, Other


Additional Past Medical Histor:  PCOS, SUBDURAL HEMOTOMA, DJD(FACET)


 (MIHIR RODRIGUEZ)


Past Surgical History:  , Other


Additional Past Surgical Histo:  cyst removal


 (MIHIR RODRIGUEZ)


Smoking:  Non-smoker


Alcohol Use:  None


 (MIHIR RODRIGUEZ)





General Adult


EDM:


Chief Complaint:  LOWER BACK PAIN OR INJURY





HPI:


HPI:





Patient is a 26 year old A1 female who presents at approximately 16 weeks 

gestation with low back pain, bilateral knee pain, and intermittent lower 

extremity swelling.  Patient notices that her feet are swollen in the shower at 

the end of the day, where she notices some pinkish discoloration and darker 

colored spots across the top of her feet and onto her ankles.  Patient has been 

treating her back pain with Tylenol, icy hot and heating pad.  She denies any 

traumatic injury to her back or knees.  Patient denies unilateral leg swelling, 

calf discoloration or tenderness, shortness of breath or cough.





Patient's last pregnancy, she was diagnosed with preeclampsia and had a subdural

hematoma as a result.  She is currently on antihypertensives per her OB/GYN.


 (MIHIR RODRIGUEZ)





Review of Systems:


Review of Systems:


ROS negative or noncontributory except as mentioned in HPI.


 (MIHIR RODRIGUEZ)





Allergies:


Allergies:





Allergies








Coded Allergies Type Severity Reaction Last Updated Verified


 


  guaifenesin Allergy Unknown  21 Yes


 


  methocarbamol Allergy Unknown  21 Yes








 (MIHIR RODRIGUEZ)





Physical Exam:


PE:





Constitutional: Well developed, well nourished, no acute distress, non-toxic ramón

earance. 


HENT: Normocephalic, atraumatic, bilateral external ears normal, nose normal. 


Eyes: EOMI, conjunctiva normal, no discharge.  


Neck: Normal range of motion, no tenderness, no stridor.  


Skin: Warm, dry, no erythema, no rash.  


Back: No step-off, no bony tenderness, no significant muscle spasm or paraspinal

tenderness.


Extremities: No tenderness, no cyanosis, no clubbing, ROM intact, no edema, no 

ballottement noted on knees bilaterally, no deformities, PT pulses 2+ and 

symmetrical, no calf tenderness, negative Homans' sign bilaterally.


Neurologic: Alert and oriented x4, steady and symmetrical upright gait, no focal

deficits noted. 


 (MIHIR RODRIGUEZ)





Current Patient Data:


Labs:





Laboratory Tests








Test


 3/28/22


19:55


 


Urine Collection Type Unknown 


 


Urine Color Yellow 


 


Urine Clarity Clear 


 


Urine pH 6.0 


 


Urine Specific Gravity 1.020 


 


Urine Protein


 Neg


(NEG-TRACE)


 


Urine Glucose (UA)


 Neg mg/dL


(NEG)


 


Urine Ketones (Stick)


 Neg mg/dL


(NEG)


 


Urine Blood Neg (NEG) 


 


Urine Nitrite Neg (NEG) 


 


Urine Bilirubin Neg (NEG) 


 


Urine Urobilinogen Dipstick


 0.2 mg/dL (0.2


mg/dL)


 


Urine Leukocyte Esterase Neg (NEG) 


 


Urine RBC 0 /HPF (0-2) 


 


Urine WBC 1-4 /HPF (0-4) 


 


Urine Squamous Epithelial


Cells Mod /LPF 





 


Urine Bacteria


 Few /HPF


(0-FEW)








Vital Signs:





                          VS - Last 72 Hours, by Label








  Date Time  Temp Pulse Resp B/P (MAP) Pulse Ox O2 Delivery O2 Flow Rate FiO2


 


3/28/22 20:04 97.9 80 16 131/78 (95) 100 Room Air  


 


3/28/22 20:04 97.9 80 16 131/78 (95) 100   








 (MIHIR RODRIGUEZ)





Heart Score:


C/O Chest Pain:  No


 (MIHIR RODRIGUEZ)





Course & Med Decision Making:


Course & Med Decision Making


Pertinent Labs and Imaging studies reviewed. (See chart for details)





Patient is a 26-year-old female in her fourth pregnancy who presents with low 

back pain, bilateral knee pain and intermittent swelling to her bilateral feet. 

In discussing history and presentation and after completing physical exam, 

patient's complaints seem to be related to her pregnancy.  Discussed at length 

nonpharmacological measures to control the swelling in her feet as well as low 

back and knee pain.  Patient states she has an appointment with her OB/GYN on 

2022, which is less than 2 weeks away.





Patient's urine does not reveal proteinuria nor is her blood pressure 

significantly elevated.





Return precautions were provided to the patient.  She understands and is 

agreeable to discharge plan, though she is upset that she was not given an MRI 

of her knees.


 (MIHIR RODRIGUEZ)


Course & Med Decision Making


Did not see or evaluate patient.  Did not discuss patient with NP.  Generally 

agree with NP's work-up and disposition per note


 (KADY BRAVO MD)


Terence Disclaimer:


Dragon Disclaimer:


This electronic medical record was generated, in whole or in part, using a voice

recognition dictation system.


 (MIHIR RODRIGUEZ)





Departure


Departure:


Impression:  


   Primary Impression:  


   Back pain during pregnancy


   Additional Impressions:  


   Knee pain, bilateral


   Qualified Codes:  M25.561 - Pain in right knee; M25.562 - Pain in left knee


   Swelling of lower extremity during pregnancy


   Qualified Codes:  O12.02 - Gestational edema, second trimester


Disposition:   HOME / SELF CARE / HOMELESS


Condition:  IMPROVED


Referrals:  


PCP,NO (PCP)


Patient Instructions:  Back Pain in Pregnancy, Exercise During Pregnancy-Sport

sMed, Knee Pain, Easy-to-Read, Pregnancy and Medications, Easy-to-Read





Additional Instructions:  


EMERGENCY DEPARTMENT GENERAL DISCHARGE INSTRUCTIONS





Thank you for coming to [facility] (ED) today and trusting us

with you care.  We trust that you had a positive experience in our Emergency 

Department.  If you wish to speak to the department management, you may call the

director at [phone].





YOUR FOLLOW UP INSTRUCTIONS ARE AS FOLLOWS:


1.  Follow up with your primary care doctor. If you do not have a primary 

doctor, please ask for a resource list of physicians or clinics that may be able

to assist you with follow up care.


2.  The emergency provider has interpreted your imaging studies, if any were 

ordered.  The radiology imaging specialist also reviewed them.  If there is a 

change in the findings, you will be notified in 48 hours when at all possible.


3.  If a lab test or culture has been done, your results will be reviewed and 

you will be notified if you need a change in treatment.


4.  Follow instructions verbalized to you and refer to the printouts if needed.





ADDITIONAL INSTRUCTIONS AND INFORMATION:


1.  Your care today has been supervised by a physician who is specially trained 

in emergency care.  Many problems require more than one evaluation for a 

complete diagnosis and treatment.  We recommend that you schedule your follow up

appointment as recommended to ensure complete treatment of you illness or 

injury.  If you are unable to obtain follow up care and continue to have a 

problem, or if your condition worsens, we recommend that you return to the ED.


2.  We are not able to safely determine your condition over the phone nor are we

able to give sound medical advice over the phone.  For these safety reasons, if 

you call for medical advice we will ask you to come to the ED for further 

evaluation.


3.  If you have any questions regarding these discharge instructions please call

the ED at [phone].





SAFETY INFORMATION:


In the interest of safety, wellness, and injury prevention; we encourage you to 

wear your seat belt, if you smoke; quite smoking, and we encourage family to use

a protective helmet for bicycling and other sporting events that present an 

increased risk for head injury.





IF YOUR SYMPTOMS WORSEN OR NEW SYMPTOMS DEVELOP, OR YOU HAVE CONCERNS ABOUT YOUR

CONDITION; OR IF YOUR CONDITION WORSENS WHILE YOU ARE WAITING FOR YOUR FOLLOW UP

APPOINTMENT; EITHER CONTACT YOUR PRIMARY CARE DOCTOR, THE PHYSICIAN WHOSE NAME 

AND NUMBER YOU WERE GIVEN, OR RETURN TO THE ED IMMEDIATELY.











MIHIR RODRIGUEZ              Mar 28, 2022 20:18


KADY BRAVO MD               Mar 28, 2022 22:49

## 2024-07-15 NOTE — EKG
[facility] [address], [place] [address]

Test Date:    2021               Test Time:    15:04:22

Pat Name:     ENZO MEJÍA         Department:   

Patient ID:   [id number]           Room:          

Gender:       F                        Technician:   TOPHER

:          1995               Requested By: SINCERE ZULETA

Order Number: [id number]            Reading MD:     

                                 Measurements

Intervals                              Axis          

Rate:         87                       P:            41

AL:           128                      QRS:          52

QRSD:         72                       T:            52

QT:           342                                    

QTc:          412                                    

                           Interpretive Statements

SINUS RHYTHM

LEFT ATRIAL ABNORMALITY

ABNORMAL ECG

RI6.02

No previous ECG available for comparison yes